# Patient Record
Sex: OTHER/UNKNOWN | Race: WHITE | Employment: FULL TIME | URBAN - METROPOLITAN AREA
[De-identification: names, ages, dates, MRNs, and addresses within clinical notes are randomized per-mention and may not be internally consistent; named-entity substitution may affect disease eponyms.]

---

## 2024-04-09 ENCOUNTER — HOSPITAL ENCOUNTER (OUTPATIENT)
Dept: RADIOLOGY | Facility: CLINIC | Age: 34
Discharge: HOME | End: 2024-04-09
Payer: COMMERCIAL

## 2024-04-09 ENCOUNTER — OFFICE VISIT (OUTPATIENT)
Dept: ORTHOPEDIC SURGERY | Facility: CLINIC | Age: 34
End: 2024-04-09
Payer: COMMERCIAL

## 2024-04-09 VITALS — WEIGHT: 155 LBS | HEIGHT: 65 IN | BODY MASS INDEX: 25.83 KG/M2

## 2024-04-09 DIAGNOSIS — G89.29 CHRONIC RIGHT SHOULDER PAIN: ICD-10-CM

## 2024-04-09 DIAGNOSIS — M25.511 CHRONIC RIGHT SHOULDER PAIN: ICD-10-CM

## 2024-04-09 DIAGNOSIS — M25.311 INSTABILITY OF RIGHT SHOULDER JOINT: ICD-10-CM

## 2024-04-09 PROCEDURE — 1036F TOBACCO NON-USER: CPT | Performed by: ORTHOPAEDIC SURGERY

## 2024-04-09 PROCEDURE — 73030 X-RAY EXAM OF SHOULDER: CPT | Mod: RT

## 2024-04-09 PROCEDURE — 73030 X-RAY EXAM OF SHOULDER: CPT | Mod: RIGHT SIDE | Performed by: STUDENT IN AN ORGANIZED HEALTH CARE EDUCATION/TRAINING PROGRAM

## 2024-04-09 PROCEDURE — 99203 OFFICE O/P NEW LOW 30 MIN: CPT | Performed by: ORTHOPAEDIC SURGERY

## 2024-04-10 NOTE — PROGRESS NOTES
33-year-old is seen with right shoulder pain.  She is a .  She is right-hand dominant.  She has pain with overhead reaching and lifting activities and the long history of instability in that shoulder since she was 14 years old.  She has participated in various activities over the years including swimming and softball and skiing.  When she was 14 years old she dislocated the shoulder playing dodgeball.  She is applied ice and use Tylenol.  She recently had an instability event while sleeping.    Pleasant and no acute distress.  Right shoulder forward flexion 160°. No effusion.  There is 2+ anterior and 1-1/2+ posterior instability.  There is positive apprehension relocation.  There is positive Neer and Hatfield impingement. There is subacromial crepitus and tenderness around the subacromial space. There is biceps tenderness. There is a positive Uehling's test. No acromioclavicular tenderness. Rotator cuff strength is intact but there is discomfort with strength testing. Left shoulder forward flexion 180°. No effusion or instability. No impingement or crepitus or tenderness involving the subacromial space. No biceps or acromioclavicular tenderness. There is intact rotator cuff strength. There is adequate range of motion of the cervical spine without pain. Both upper extremities are well perfused, skin is intact and muscle tone is adequate. Elbow flexion and extension and wrist flexion and extension strength are intact.  She has signs of generalized ligamentous laxity with hyperextension at the elbow and fingers and ability to touch her thumb to her forearm.    Multiple x-ray views of the right shoulder are personally reviewed and there is no acute bony abnormality.    A discussion about her shoulder was done.  Discussion about shoulder instability was done.  Treatment options were reviewed.  She will perform physical therapy focusing on rotator cuff strengthening and scapular stabilization.  She  can ice and use Tylenol and avoid aggravating activities.  If she has persistent symptoms then MRI would be obtained to assess for labral tear and potentially consider shoulder arthroscopy.

## 2024-04-23 ENCOUNTER — EVALUATION (OUTPATIENT)
Dept: PHYSICAL THERAPY | Facility: CLINIC | Age: 34
End: 2024-04-23
Payer: COMMERCIAL

## 2024-04-23 DIAGNOSIS — M25.311 INSTABILITY OF RIGHT SHOULDER JOINT: ICD-10-CM

## 2024-04-23 DIAGNOSIS — G89.29 CHRONIC RIGHT SHOULDER PAIN: ICD-10-CM

## 2024-04-23 DIAGNOSIS — M25.511 CHRONIC RIGHT SHOULDER PAIN: ICD-10-CM

## 2024-04-23 PROCEDURE — 97161 PT EVAL LOW COMPLEX 20 MIN: CPT | Mod: GP | Performed by: PHYSICAL THERAPIST

## 2024-04-23 PROCEDURE — 97110 THERAPEUTIC EXERCISES: CPT | Mod: GP | Performed by: PHYSICAL THERAPIST

## 2024-04-23 ASSESSMENT — PATIENT HEALTH QUESTIONNAIRE - PHQ9
1. LITTLE INTEREST OR PLEASURE IN DOING THINGS: NOT AT ALL
SUM OF ALL RESPONSES TO PHQ9 QUESTIONS 1 AND 2: 0
2. FEELING DOWN, DEPRESSED OR HOPELESS: NOT AT ALL

## 2024-04-23 NOTE — PROGRESS NOTES
Physical Therapy    Physical Therapy Evaluation    Patient Name: Anastasiya Aceves  MRN: [id number]  Today's Date: 04/23/24  Time Calculation  Start Time: 1000  Stop Time: 1045  Time Calculation (min): 45 min     Problem List Items Addressed This Visit    None  Visit Diagnoses         Codes    Chronic right shoulder pain     M25.511, G89.29    Relevant Orders    Follow Up In Physical Therapy    Instability of right shoulder joint     M25.311    Relevant Orders    Follow Up In Physical Therapy            Insurance:  Visit number: 1 of 40 PT/OT V PCY 0 USED NO AUTH NEEDED    Insurance Type: Payor: [employer] [facility] / Plan: [employer] / Product Type: *No Product type* /     General:  Reason for visit: R shoulder pain.   Referred by: Venkata Gilliam MD appt:  follow up PRN     Precautions:  None.  Fall Risk: None     Assessment:   Chronic R shoulder instability since age 14.  Most recent episode about 3 weeks, when she rolled over in bed her R shoulder popped out of joint.  She relocates it herself.  It was extremely painful, more painful than prior episodes.    Impairments: Pain and Strength and R shoulder hypermobility.    Functional Limitations: Reaching, Lifting, and Sleeping    Recommended Treatment:  Therapeutic exercise, Manual therapy, Home program instruction and progression, Neuromuscular re-education, Therapeutic activities, Self care and home management, and Instruction in activity modification      Plan:  Plan of care was developed with input and agreement by the patient.  1 x week x 6 weeks.    Rehab Potential:   Good to achieve goals.    Goals:  -QuickDash= 5 % disability  to indicate a significant improvement in overall function. (MDC 10% points)    -Patient will demonstrate 5/5 rotator cuff strength (all planes) to allow for correct reach/lift mechanics     -Patient will demo correct posture with min to no cueing to allow for correct loading strategy     -Patient will demo mild to  no limitation AROM of the right shoulder to allow for correct mechanics with functional mobility.     -Patient will report reaching overhead without pain to allow for return to work and ADLs without limitation.     -Patient will report driving without pain to allow for return to work and ADLs without limitation.         Subjective:  CC:  Chronic R shoulder instability since age 14.  Most recent episode about 3 weeks, when she rolled over in bed.  In the past when she played Softball or swam it would pop out.  Occurs about 1 x every couple years.  R hand dominant.  Pain when it pop s out.  Painful to relocate.  Most recent episode was very painful.  Had to take a day off work.  She has been trying the past 5 yrs to regain muscle in the shoulder.  Reports occasional click in the shoulder.  KATHIE:  Dodgeball age 14     PAIN - Location: R shoulder Worst(past 24 hours): 3  Least(past 24 hours): 0  PAIN - Alleviating: topical biofreeze   Aggravating: movement and overhead work  CURRENT MEDICAL MANAGEMENT: xray  PLOF: Indep.  Mild to no R shoulder pain.  FUNCTIONAL LIMITATIONS:  Burning pain at night time.      WORK: [employer].  EXERCISE:  working out at [employer], lat pulldowns, [employer].  Works out about 2 days/week  Patient Stated Goal:  stability In the R shoulder.  Exercises for gym.    Medical History Form: Reviewed (scanned into chart)       Objective:     - SHOULDER ROM:   Shoulder ROM: full    - MUSCLE STRENGTH:    ER R L         Trial 1 12.1 13.6         Trial 2 10.9 12.4         Trial 3 10.2 11.1         Av.1 12.4  R L ratio 0.89  L R ratio 1.12              IR R L         Trial 1 12.1 12.8         Trial 2 13.7 13.2         Trial 3 13.1 12.7         Av.0 12.9  R L ratio 1.01  L R ratio 0.99                         ER IR ratio 0.9 1.0             - SPECIAL TESTS:   Special Tests Right Shoulder: .  Neer R: positive   Pain or weakness with ER MMT R: negative   Drop Arm R: negative  Anterior Apprehension R:  "positive    Catching, clicking, Locking Sensation R: positive   Beighton Scale: 7       Outcome Measures:  Other Measures  Disability of Arm Shoulder Hand (DASH): 21     Treatment Performed: (\"NP\" = Not Performed)     Therapeutic Exercise:     25 minutes  Home exercise program instructed and issued.  Access Code: [email or web address]  Issued Blue and Black bands and door loop    - Standing Row with Anchored Resistance  - 3 x weekly - 2-3 sets - 10 reps  - Shoulder Extension with Resistance  - 3 x weekly - 2-3 sets - 10 reps  - Shoulder External Rotation with Anchored Resistance  - 3 x weekly - 2-3 sets - 10 reps  - Shoulder Internal Rotation with Resistance  - 3 x weekly - 2-3 sets - 10 reps  - Ball on wall 90 degrees of flexion: 30\" up/down, side/side    Manual Therapy:       minutes      Neuromuscular Re-education:      minutes      Gait Training:            minutes      Aquatics:            minutes      Therapeutic Activity:      minutes      Modalities:       Vasopneumatic Device       minutes  Electrical Stimulation          minutes  Ultrasound            minutes  Iontophoresis                     minutes  Cold Pack            minutes  Mechanical Traction           minutes    Self Care Home Management:    minutes    Canalith Reposition:          minutes     Education:          minutes    Other:       minutes      Evaluation Complexity: Low: 20 minutes; Moderate   minutes; Complex PT Evaluation Time Entry: 20;  minutes    Re-Evaluation:   minutes    "

## 2024-05-06 ENCOUNTER — TREATMENT (OUTPATIENT)
Dept: PHYSICAL THERAPY | Facility: CLINIC | Age: 34
End: 2024-05-06
Payer: COMMERCIAL

## 2024-05-06 DIAGNOSIS — M25.511 CHRONIC RIGHT SHOULDER PAIN: Primary | ICD-10-CM

## 2024-05-06 DIAGNOSIS — M25.311 INSTABILITY OF RIGHT SHOULDER JOINT: ICD-10-CM

## 2024-05-06 DIAGNOSIS — G89.29 CHRONIC RIGHT SHOULDER PAIN: Primary | ICD-10-CM

## 2024-05-06 PROCEDURE — 97110 THERAPEUTIC EXERCISES: CPT | Mod: GP | Performed by: PHYSICAL THERAPIST

## 2024-05-06 NOTE — PROGRESS NOTES
"                                                                                                                         PHYSICAL THERAPY TREATMENT NOTE    Patient Name:  Anastasiya Aceves   Patient MRN: [id number]  Date: 05/06/24  Time Calculation  Start Time: 1132  Stop Time: 1213  Time Calculation (min): 41 min      Problem List Items Addressed This Visit    None  Visit Diagnoses         Codes    Chronic right shoulder pain    -  Primary M25.511, G89.29    Instability of right shoulder joint     M25.311            Insurance:  Visit number: 2 of  40 PT/OT V PCY 0 USED NO AUTH NEEDED    Insurance Type: Payor: MEDICAL [facility] / Plan: [employer] / Product Type: *No Product type* /     General:  Reason for visit: R shoulder pain/chronic instability.  She is R handed.  Referred by: Venkata Gilliam MD appt:  follow up PRN     Precautions:  None.  Fall Risk: None    Assessment:  Education: Home exercise program instructed and issued.  Progress towards functional goals: Improved lifting ability. Improved reaching ability. Reduced frequency of pain. Reduced intensity of pain.  Response to interventions: Patient tolerated therapeutic interventions well and without any adverse events.  Justification for continued skilled care: Progressive strengthening to stabilize the R GH joint/scap stabilizers to improve function.    Plan:  Exercises targeting surrounding musculature to stabilize the joint and improve function.    Subjective:    NO instability since I've seen her last.  Shoulder is feeling stronger.   Progress towards functional goal:  Improved lifting ability. Improved reaching ability.   Pain (0-10): 0    HEP adherence / understanding: partial compliance with the instructed home exercises.  Her fiance broke her foot so she's been busy with care taking and helping.    Objective:Full R shoulder ROM with crepitus thru ROM, but no pain.    Treatment Performed: (\"NP\" = Not Performed)     Therapeutic " "Exercise:     41 minutes  Access Code: [email or web address]  Issued Blue and Black bands and door loop  Arm Bike: 2.5/2.5 minutes  Rhythmic stabs: R ER 30\", 90 flexion palms down, 90 flexion palms up, OH palms down, OH palms Up: 30 seconds ea  Bicep Curl: 10# 2 x 10   Partial Plinth side to side walk 30\" x 3  OH RED SB dribble: 30\" x 3   Blaze Pod s 1 round ea.  Vivien 11 and Cog Challenge.  Partial plinth plank shoulder taps: 3 x 20\"  Row cable column 15# 3 x 15   **ACTIVITIES BELOW WERE NOT PERFORMED**   Standing Row with Anchored Resistance  - 3 x weekly - 2-3 sets - 10 reps  Shoulder Extension with Resistance  - 3 x weekly - 2-3 sets - 10 reps  Shoulder External Rotation with Anchored Resistance  - 3 x weekly - 2-3 sets - 10 reps  Shoulder Internal Rotation with Resistance  - 3 x weekly - 2-3 sets - 10 reps  Ball on wall 90 degrees of flexion: 30\" up/down, side/side      Manual Therapy:       minutes      Neuromuscular Re-education:      minutes      Gait Training:            minutes      Aquatics:            minutes      Therapeutic Activity:      minutes      Gait Training:            minutes      Modalities:       Vasopneumatic Device       minutes  Electrical Stimulation          minutes  Ultrasound            minutes  Iontophoresis                     minutes  Cold Pack            minutes  Mechanical Traction           minutes    Self Care Home Management:    minutes    Canalith Reposition:          minutes     Education:          minutes    Other:       minutes      Evaluation Complexity: Low:   minutes; Moderate   minutes; Complex   minutes    Re-Evaluation:   minutes           "

## 2024-05-14 ENCOUNTER — TREATMENT (OUTPATIENT)
Dept: PHYSICAL THERAPY | Facility: CLINIC | Age: 34
End: 2024-05-14
Payer: COMMERCIAL

## 2024-05-14 DIAGNOSIS — G89.29 CHRONIC RIGHT SHOULDER PAIN: Primary | ICD-10-CM

## 2024-05-14 DIAGNOSIS — M25.311 INSTABILITY OF RIGHT SHOULDER JOINT: ICD-10-CM

## 2024-05-14 DIAGNOSIS — M25.511 CHRONIC RIGHT SHOULDER PAIN: Primary | ICD-10-CM

## 2024-05-14 PROCEDURE — 97110 THERAPEUTIC EXERCISES: CPT | Mod: GP | Performed by: PHYSICAL THERAPIST

## 2024-05-14 NOTE — PROGRESS NOTES
"                                                                                                                         PHYSICAL THERAPY TREATMENT NOTE    Patient Name:  Anastasiya Aceves   Patient MRN: [id number]  Date: 05/14/24  Time Calculation  Start Time: 1000  Stop Time: 1045  Time Calculation (min): 45 min      Problem List Items Addressed This Visit             ICD-10-CM    Instability of right shoulder joint M25.311    Chronic right shoulder pain - Primary M25.511, G89.29         Insurance:  Visit number: 3 of  40 PT/OT V PCY 0 USED NO AUTH NEEDED    Insurance Type: Payor: MEDICAL [facility] / Plan: [employer] / Product Type: *No Product type* /     General:  Reason for visit: R shoulder pain/chronic instability.  She is R handed.  Referred by: Venkata Gilliam MD appt:  follow up PRN     Precautions:  None.  Fall Risk: None    Assessment:  Education: Reviewed home exercise program.  Progress towards functional goals: Improved reaching ability. Improved ability to complete activities in the community.  Response to interventions: Tolerated treatment session well without any increase in pain.  Justification for continued skilled care: Progressive strengthening to stabilize the R GH joint to improve function.    Plan:  Exercises targeting surrounding musculature to stabilize the joint and improve function.    Subjective:   Anastasiya reports Anastasiya feels like Anastasiya's condition is improving.  Patient reports:  Improved lifting ability. Improved reaching ability. Improved ability to complete activities in the community.   Pain (0-10): 0    HEP adherence / understanding: compliance with the instructed home exercises.    Objective:Full R shoulder ROM with crepitus thru ROM, but no pain.    Treatment Performed: (\"NP\" = Not Performed)     Therapeutic Exercise:     45 minutes  Access Code: [email or web address]  Issued Blue and Black bands and door loop  Arm Bike: 2.5/2.5 minutes  OH dribble Green ball: 30\"   ER " "dribble: Green ball: 30\"   Wall drops x 30\"   Ball drops in flexion/Abd/scapt: 20\" x 1  Blaze Pod s 1 round ea. SLS  Vivien 11   Rhythmic stabs: R ER 30\", 90 flexion palms down, 90 flexion palms up, OH palms down, OH palms Up: 30 seconds ea  Bicep Curl: 10# 2 x 10   Partial plank Plinth side to side walk 30\" x 3  Partial plank Plinth shoulder taps. 30\" x 3   OH RED SB dribble: 30\" x 3   Ball on wall: 90 flexion, 90 Abd: 30\" up/down, AP  Partial plinth plank shoulder taps: 3 x 20\"  Row cable column 15# 3 x 10   LAE: cable column: 10# 3 x 10   **ACTIVITIES BELOW WERE NOT PERFORMED**   Standing Row with Anchored Resistance  - 3 x weekly - 2-3 sets - 10 reps  Shoulder Extension with Resistance  - 3 x weekly - 2-3 sets - 10 reps  Shoulder External Rotation with Anchored Resistance  - 3 x weekly - 2-3 sets - 10 reps  Shoulder Internal Rotation with Resistance  - 3 x weekly - 2-3 sets - 10 reps  Ball on wall 90 degrees of flexion: 30\" up/down, side/side      Manual Therapy:       minutes      Neuromuscular Re-education:      minutes      Gait Training:            minutes      Aquatics:            minutes      Therapeutic Activity:      minutes      Gait Training:            minutes      Modalities:       Vasopneumatic Device       minutes  Electrical Stimulation          minutes  Ultrasound            minutes  Iontophoresis                     minutes  Cold Pack            minutes  Mechanical Traction           minutes    Self Care Home Management:    minutes    Canalith Reposition:          minutes     Education:          minutes    Other:       minutes      Evaluation Complexity: Low:   minutes; Moderate   minutes; Complex   minutes    Re-Evaluation:   minutes           "

## 2024-05-28 ENCOUNTER — APPOINTMENT (OUTPATIENT)
Dept: PHYSICAL THERAPY | Facility: CLINIC | Age: 34
End: 2024-05-28
Payer: COMMERCIAL

## 2024-06-04 ENCOUNTER — APPOINTMENT (OUTPATIENT)
Dept: PHYSICAL THERAPY | Facility: CLINIC | Age: 34
End: 2024-06-04
Payer: COMMERCIAL

## 2024-06-17 ENCOUNTER — APPOINTMENT (OUTPATIENT)
Dept: PHYSICAL THERAPY | Facility: CLINIC | Age: 34
End: 2024-06-17
Payer: COMMERCIAL

## 2024-06-25 ENCOUNTER — APPOINTMENT (OUTPATIENT)
Dept: PHYSICAL THERAPY | Facility: CLINIC | Age: 34
End: 2024-06-25
Payer: COMMERCIAL

## 2024-07-02 ENCOUNTER — APPOINTMENT (OUTPATIENT)
Dept: PHYSICAL THERAPY | Facility: CLINIC | Age: 34
End: 2024-07-02
Payer: COMMERCIAL

## 2024-07-23 ENCOUNTER — DOCUMENTATION WITH CHARGES (OUTPATIENT)
Dept: PHYSICAL THERAPY | Facility: CLINIC | Age: 34
End: 2024-07-23
Payer: COMMERCIAL

## 2024-07-23 NOTE — PROGRESS NOTES
This serves as Physical Therapy discharge for this patient.  See last PT note.  Patient cancelled remaining PT appointments.